# Patient Record
Sex: MALE | URBAN - METROPOLITAN AREA
[De-identification: names, ages, dates, MRNs, and addresses within clinical notes are randomized per-mention and may not be internally consistent; named-entity substitution may affect disease eponyms.]

---

## 2018-08-29 ENCOUNTER — APPOINTMENT (RX ONLY)
Dept: URBAN - METROPOLITAN AREA CLINIC 138 | Facility: CLINIC | Age: 48
Setting detail: DERMATOLOGY
End: 2018-08-29

## 2018-08-29 DIAGNOSIS — L81.4 OTHER MELANIN HYPERPIGMENTATION: ICD-10-CM

## 2018-08-29 DIAGNOSIS — L57.0 ACTINIC KERATOSIS: ICD-10-CM

## 2018-08-29 DIAGNOSIS — D22 MELANOCYTIC NEVI: ICD-10-CM

## 2018-08-29 DIAGNOSIS — L82.1 OTHER SEBORRHEIC KERATOSIS: ICD-10-CM

## 2018-08-29 DIAGNOSIS — D18.0 HEMANGIOMA: ICD-10-CM

## 2018-08-29 DIAGNOSIS — L57.8 OTHER SKIN CHANGES DUE TO CHRONIC EXPOSURE TO NONIONIZING RADIATION: ICD-10-CM

## 2018-08-29 PROBLEM — D18.01 HEMANGIOMA OF SKIN AND SUBCUTANEOUS TISSUE: Status: ACTIVE | Noted: 2018-08-29

## 2018-08-29 PROBLEM — Z85.820 PERSONAL HISTORY OF MALIGNANT MELANOMA OF SKIN: Status: ACTIVE | Noted: 2018-08-29

## 2018-08-29 PROBLEM — F41.9 ANXIETY DISORDER, UNSPECIFIED: Status: ACTIVE | Noted: 2018-08-29

## 2018-08-29 PROBLEM — D48.5 NEOPLASM OF UNCERTAIN BEHAVIOR OF SKIN: Status: ACTIVE | Noted: 2018-08-29

## 2018-08-29 PROBLEM — L29.8 OTHER PRURITUS: Status: ACTIVE | Noted: 2018-08-29

## 2018-08-29 PROCEDURE — 17003 DESTRUCT PREMALG LES 2-14: CPT

## 2018-08-29 PROCEDURE — 17000 DESTRUCT PREMALG LESION: CPT

## 2018-08-29 PROCEDURE — ? BIOPSY BY SHAVE METHOD

## 2018-08-29 PROCEDURE — ? COUNSELING

## 2018-08-29 PROCEDURE — ? LIQUID NITROGEN

## 2018-08-29 PROCEDURE — 99202 OFFICE O/P NEW SF 15 MIN: CPT | Mod: 25

## 2018-08-29 PROCEDURE — 11100: CPT | Mod: 59

## 2018-08-29 PROCEDURE — 11101: CPT

## 2018-08-29 ASSESSMENT — LOCATION ZONE DERM
LOCATION ZONE: TRUNK
LOCATION ZONE: FEET
LOCATION ZONE: HAND
LOCATION ZONE: FACE
LOCATION ZONE: ARM

## 2018-08-29 ASSESSMENT — LOCATION SIMPLE DESCRIPTION DERM
LOCATION SIMPLE: LEFT PLANTAR SURFACE
LOCATION SIMPLE: RIGHT FOREHEAD
LOCATION SIMPLE: LEFT FOREARM
LOCATION SIMPLE: LEFT HAND
LOCATION SIMPLE: LEFT UPPER ARM
LOCATION SIMPLE: RIGHT UPPER BACK
LOCATION SIMPLE: LEFT CHEEK
LOCATION SIMPLE: CHEST
LOCATION SIMPLE: RIGHT FOREARM
LOCATION SIMPLE: RIGHT UPPER ARM

## 2018-08-29 ASSESSMENT — LOCATION DETAILED DESCRIPTION DERM
LOCATION DETAILED: LEFT MEDIAL PLANTAR MIDFOOT
LOCATION DETAILED: LEFT VENTRAL DISTAL FOREARM
LOCATION DETAILED: RIGHT MEDIAL UPPER BACK
LOCATION DETAILED: LEFT LATERAL PLANTAR MIDFOOT
LOCATION DETAILED: RIGHT ANTERIOR PROXIMAL UPPER ARM
LOCATION DETAILED: LEFT ULNAR DORSAL HAND
LOCATION DETAILED: RIGHT MEDIAL SUPERIOR CHEST
LOCATION DETAILED: RIGHT INFERIOR LATERAL FOREHEAD
LOCATION DETAILED: RIGHT VENTRAL DISTAL FOREARM
LOCATION DETAILED: LEFT CENTRAL BUCCAL CHEEK
LOCATION DETAILED: LEFT ANTERIOR PROXIMAL UPPER ARM

## 2022-12-10 ENCOUNTER — EMERGENCY (EMERGENCY)
Facility: HOSPITAL | Age: 52
LOS: 1 days | Discharge: HOME | End: 2022-12-10
Attending: EMERGENCY MEDICINE
Payer: MEDICARE

## 2022-12-10 VITALS
SYSTOLIC BLOOD PRESSURE: 122 MMHG | HEART RATE: 85 BPM | DIASTOLIC BLOOD PRESSURE: 76 MMHG | OXYGEN SATURATION: 98 % | RESPIRATION RATE: 18 BRPM | TEMPERATURE: 99 F

## 2022-12-10 DIAGNOSIS — F20.9 SCHIZOPHRENIA, UNSPECIFIED: ICD-10-CM

## 2022-12-10 LAB
AMPHET UR-MCNC: NEGATIVE — SIGNIFICANT CHANGE UP
ANION GAP SERPL CALC-SCNC: 11 MMOL/L — SIGNIFICANT CHANGE UP (ref 5–17)
APAP SERPL-MCNC: <3 UG/ML — LOW (ref 10–26)
APPEARANCE UR: CLEAR — SIGNIFICANT CHANGE UP
BACTERIA # UR AUTO: ABNORMAL
BARBITURATES UR SCN-MCNC: NEGATIVE — SIGNIFICANT CHANGE UP
BASOPHILS # BLD AUTO: 0.06 K/UL — SIGNIFICANT CHANGE UP (ref 0–0.2)
BASOPHILS NFR BLD AUTO: 0.7 % — SIGNIFICANT CHANGE UP (ref 0–2)
BENZODIAZ UR-MCNC: NEGATIVE — SIGNIFICANT CHANGE UP
BILIRUB UR-MCNC: NEGATIVE — SIGNIFICANT CHANGE UP
BUN SERPL-MCNC: 10.5 MG/DL — SIGNIFICANT CHANGE UP (ref 8–20)
CALCIUM SERPL-MCNC: 9.7 MG/DL — SIGNIFICANT CHANGE UP (ref 8.4–10.5)
CHLORIDE SERPL-SCNC: 97 MMOL/L — SIGNIFICANT CHANGE UP (ref 96–108)
CO2 SERPL-SCNC: 25 MMOL/L — SIGNIFICANT CHANGE UP (ref 22–29)
COCAINE METAB.OTHER UR-MCNC: NEGATIVE — SIGNIFICANT CHANGE UP
COLOR SPEC: YELLOW — SIGNIFICANT CHANGE UP
CREAT SERPL-MCNC: 0.83 MG/DL — SIGNIFICANT CHANGE UP (ref 0.5–1.3)
DIFF PNL FLD: NEGATIVE — SIGNIFICANT CHANGE UP
EGFR: 105 ML/MIN/1.73M2 — SIGNIFICANT CHANGE UP
EOSINOPHIL # BLD AUTO: 0.15 K/UL — SIGNIFICANT CHANGE UP (ref 0–0.5)
EOSINOPHIL NFR BLD AUTO: 1.6 % — SIGNIFICANT CHANGE UP (ref 0–6)
EPI CELLS # UR: SIGNIFICANT CHANGE UP
ETHANOL SERPL-MCNC: <10 MG/DL — SIGNIFICANT CHANGE UP (ref 0–9)
FLUAV AG NPH QL: SIGNIFICANT CHANGE UP
FLUBV AG NPH QL: SIGNIFICANT CHANGE UP
GLUCOSE SERPL-MCNC: 91 MG/DL — SIGNIFICANT CHANGE UP (ref 70–99)
GLUCOSE UR QL: NEGATIVE MG/DL — SIGNIFICANT CHANGE UP
HCT VFR BLD CALC: 45.8 % — SIGNIFICANT CHANGE UP (ref 39–50)
HGB BLD-MCNC: 14.9 G/DL — SIGNIFICANT CHANGE UP (ref 13–17)
IMM GRANULOCYTES NFR BLD AUTO: 0.4 % — SIGNIFICANT CHANGE UP (ref 0–0.9)
KETONES UR-MCNC: NEGATIVE — SIGNIFICANT CHANGE UP
LEUKOCYTE ESTERASE UR-ACNC: ABNORMAL
LYMPHOCYTES # BLD AUTO: 1.86 K/UL — SIGNIFICANT CHANGE UP (ref 1–3.3)
LYMPHOCYTES # BLD AUTO: 20.3 % — SIGNIFICANT CHANGE UP (ref 13–44)
MCHC RBC-ENTMCNC: 30.3 PG — SIGNIFICANT CHANGE UP (ref 27–34)
MCHC RBC-ENTMCNC: 32.5 GM/DL — SIGNIFICANT CHANGE UP (ref 32–36)
MCV RBC AUTO: 93.1 FL — SIGNIFICANT CHANGE UP (ref 80–100)
METHADONE UR-MCNC: NEGATIVE — SIGNIFICANT CHANGE UP
MONOCYTES # BLD AUTO: 0.6 K/UL — SIGNIFICANT CHANGE UP (ref 0–0.9)
MONOCYTES NFR BLD AUTO: 6.5 % — SIGNIFICANT CHANGE UP (ref 2–14)
NEUTROPHILS # BLD AUTO: 6.46 K/UL — SIGNIFICANT CHANGE UP (ref 1.8–7.4)
NEUTROPHILS NFR BLD AUTO: 70.5 % — SIGNIFICANT CHANGE UP (ref 43–77)
NITRITE UR-MCNC: NEGATIVE — SIGNIFICANT CHANGE UP
OPIATES UR-MCNC: NEGATIVE — SIGNIFICANT CHANGE UP
PCP SPEC-MCNC: SIGNIFICANT CHANGE UP
PCP UR-MCNC: NEGATIVE — SIGNIFICANT CHANGE UP
PH UR: 6 — SIGNIFICANT CHANGE UP (ref 5–8)
PLATELET # BLD AUTO: 297 K/UL — SIGNIFICANT CHANGE UP (ref 150–400)
POTASSIUM SERPL-MCNC: 4 MMOL/L — SIGNIFICANT CHANGE UP (ref 3.5–5.3)
POTASSIUM SERPL-SCNC: 4 MMOL/L — SIGNIFICANT CHANGE UP (ref 3.5–5.3)
PROT UR-MCNC: NEGATIVE — SIGNIFICANT CHANGE UP
RBC # BLD: 4.92 M/UL — SIGNIFICANT CHANGE UP (ref 4.2–5.8)
RBC # FLD: 13.7 % — SIGNIFICANT CHANGE UP (ref 10.3–14.5)
RBC CASTS # UR COMP ASSIST: SIGNIFICANT CHANGE UP /HPF (ref 0–4)
RSV RNA NPH QL NAA+NON-PROBE: SIGNIFICANT CHANGE UP
SALICYLATES SERPL-MCNC: <0.6 MG/DL — LOW (ref 10–20)
SARS-COV-2 RNA SPEC QL NAA+PROBE: SIGNIFICANT CHANGE UP
SODIUM SERPL-SCNC: 133 MMOL/L — LOW (ref 135–145)
SP GR SPEC: 1.01 — SIGNIFICANT CHANGE UP (ref 1.01–1.02)
THC UR QL: NEGATIVE — SIGNIFICANT CHANGE UP
UROBILINOGEN FLD QL: NEGATIVE MG/DL — SIGNIFICANT CHANGE UP
VALPROATE SERPL-MCNC: <3.7 UG/ML — LOW (ref 50–100)
WBC # BLD: 9.17 K/UL — SIGNIFICANT CHANGE UP (ref 3.8–10.5)
WBC # FLD AUTO: 9.17 K/UL — SIGNIFICANT CHANGE UP (ref 3.8–10.5)
WBC UR QL: SIGNIFICANT CHANGE UP /HPF (ref 0–5)

## 2022-12-10 PROCEDURE — 93010 ELECTROCARDIOGRAM REPORT: CPT

## 2022-12-10 PROCEDURE — 99218: CPT

## 2022-12-10 PROCEDURE — 99283 EMERGENCY DEPT VISIT LOW MDM: CPT

## 2022-12-10 RX ORDER — ARIPIPRAZOLE 15 MG/1
15 TABLET ORAL ONCE
Refills: 0 | Status: COMPLETED | OUTPATIENT
Start: 2022-12-10 | End: 2022-12-10

## 2022-12-10 RX ORDER — ARIPIPRAZOLE 15 MG/1
15 TABLET ORAL DAILY
Refills: 0 | Status: DISCONTINUED | OUTPATIENT
Start: 2022-12-10 | End: 2022-12-17

## 2022-12-10 RX ORDER — DIVALPROEX SODIUM 500 MG/1
500 TABLET, DELAYED RELEASE ORAL
Refills: 0 | Status: DISCONTINUED | OUTPATIENT
Start: 2022-12-10 | End: 2022-12-17

## 2022-12-10 RX ORDER — TRAZODONE HCL 50 MG
150 TABLET ORAL AT BEDTIME
Refills: 0 | Status: DISCONTINUED | OUTPATIENT
Start: 2022-12-10 | End: 2022-12-17

## 2022-12-10 RX ORDER — DIVALPROEX SODIUM 500 MG/1
250 TABLET, DELAYED RELEASE ORAL ONCE
Refills: 0 | Status: COMPLETED | OUTPATIENT
Start: 2022-12-10 | End: 2022-12-10

## 2022-12-10 RX ADMIN — DIVALPROEX SODIUM 250 MILLIGRAM(S): 500 TABLET, DELAYED RELEASE ORAL at 10:18

## 2022-12-10 RX ADMIN — ARIPIPRAZOLE 15 MILLIGRAM(S): 15 TABLET ORAL at 10:18

## 2022-12-10 RX ADMIN — DIVALPROEX SODIUM 500 MILLIGRAM(S): 500 TABLET, DELAYED RELEASE ORAL at 18:04

## 2022-12-10 RX ADMIN — Medication 150 MILLIGRAM(S): at 22:37

## 2022-12-10 NOTE — ED ADULT NURSE NOTE - CHIEF COMPLAINT QUOTE
pt BIBEMS due to suicidal thoughts. hx of paranoid schizophrenia and bipolar and was recently at [facility] under voluntary psych admission but reports hes off meds as he ran out. pt changed to yellow gown.

## 2022-12-10 NOTE — ED PROVIDER NOTE - CLINICAL SUMMARY MEDICAL DECISION MAKING FREE TEXT BOX
Decompensated schizophrenia due to medication nonadherence, will require psychiatric evaluation for risk assessment and disposition planning.

## 2022-12-10 NOTE — ED PROVIDER NOTE - OBJECTIVE STATEMENT
52yom w/ schizophrenia, nonadherent to medication regimen (depakote, abilify) due to homeless states he has been hearing voices telling him to kill himself for the past 2 weeks. Denies any specific plan.

## 2022-12-10 NOTE — ED BEHAVIORAL HEALTH PROGRESS NOTE - DETAILS
Pending bed availability  Patient stating he is hearing command auditory hallucinations telling him to hang himself, he states they are new onset.  Telling him to hang himself  Self referred

## 2022-12-10 NOTE — ED BEHAVIORAL HEALTH ASSESSMENT NOTE - HPI (INCLUDE ILLNESS QUALITY, SEVERITY, DURATION, TIMING, CONTEXT, MODIFYING FACTORS, ASSOCIATED SIGNS AND SYMPTOMS)
Patient a 53 y/o single male, un-domiciled, past psychiatric hx of Schizophrenia, most recent Hospitalization in [facility] for 30 days, hx of Cannabis abuse, one SA by OD 2 years back, no aggression. no NSSIB, no legal issues, no medical issues currently, was BIB/EMS activated by self due to above complaints.    Patient in bed, AAOX3, endorses that he was diagnosed with Schizophrenia, unemployed, homeless and not taking meds for 2 weeks as he ran pout pf meds given to him on discharge and not aware where he was supposed to f/u after discharge and now endorsing that he stared to hear voices, low in intensity, with fair to good sleep/appetite. he endorses that he takes Abilify 30 mg with Depakote and as he has not been taking meds he stared to hear voices and the voices were telling him to self harm. he has no intention to harm self at this time, and agreed to have the meds e.g. Abilify/Depakote. He was given 1 dose of Abilify 15 mg with Depakote 250 mg and to be observed for few hours and agreed to have a referral to a shelter as he has no place to stay. He has not been following anywhere, and goes from place top place. he added that the only time he committed suicide was 2 years back when he OD on his meds and was hospitalized at a [facility]. He also agreed to have a referral to a place and would take meds if send to a Pharmacy. He also smokes cannabis daily since age 14 and has no rehab for Cannabis abuse.     No medical issues now

## 2022-12-10 NOTE — ED BEHAVIORAL HEALTH ASSESSMENT NOTE - SUMMARY
Patient a 51 y/o single male, un-domiciled, past psychiatric hx of Schizophrenia, most recent Hospitalization in [facility] for 30 days, hx of Cannabis abuse, one SA by OD 2 years back, no aggression. no NSSIB, no legal issues, no medical issues currently, was BIB/EMS activated by self due to above complaints.    Patient in bed, AAOX3, endorses that he was diagnosed with Schizophrenia, unemployed, homeless and not taking meds for 2 weeks as he ran pout pf meds given to him on discharge and not aware where he was supposed to f/u after discharge and now endorsing that he stared to hear voices, low in intensity, with fair to good sleep/appetite. he endorses that he takes Abilify 30 mg with Depakote and as he has not been taking meds he stared to hear voices and the voices were telling him to self harm. he has no intention to harm self at this time, and agreed to have the meds e.g. Abilify/Depakote. He was given 1 dose of Abilify 15 mg with Depakote 250 mg and to be observed for few hours and agreed to have a referral to a shelter as he has no place to stay. He has not been following anywhere, and goes from place top place. he added that the only time he committed suicide was 2 years back when he OD on his meds and was hospitalized at a [facility]. He also agreed to have a referral to a place and would take meds if send to a Pharmacy. He also smokes cannabis daily since age 14 and has no rehab for Cannabis abuse.     Plan: To give 1 dose of Abilify 15 mg with Depakote 250 mg          Observe for few hours          Shelter Referral          May Discharge once accepted at shelter with 2 weeks meds supply          [facility] Referral if discharged

## 2022-12-10 NOTE — ED BEHAVIORAL HEALTH PROGRESS NOTE - SUMMARY
Patient a 51 y/o single male, un-domiciled, past psychiatric hx of Schizophrenia, most recent Hospitalization in [facility] for 30 days, hx of Cannabis abuse, one SA by OD 2 years back, no aggression. no NSSIB, no legal issues, no medical issues currently, was BIB/EMS activated by self due to auditory hallucinations.     Patient in bed, AAOX3, endorses that he was diagnosed with Schizophrenia, unemployed, homeless and not taking meds for 2 weeks as he ran pout of meds given to him on discharge and not aware where he was supposed to f/u after discharge and now endorsing that he stared to hear voices, low in intensity, with fair to good sleep/appetite. Patient was evaluated this morning by attending physician with plan to give 1 dose of Abilify 15 mg with Depakote 250 mg, observe for a few hours and provide with Shelter Referral. Patient is now endorsing suicidal ideation induced by command auditory hallucinations telling him to hang himself, patient will be transferred voluntary for inpatient psychiatric hospitalization.

## 2022-12-10 NOTE — ED BEHAVIORAL HEALTH PROGRESS NOTE - CASE SUMMARY/FORMULATION (CLEARLY DOCUMENT RATIONALE FOR DISPOSITION CHANGE)
Patient a 51 y/o single male, un-domiciled, past psychiatric hx of Schizophrenia, most recent Hospitalization in [facility] for 30 days, hx of Cannabis abuse, one SA by OD 2 years back, no aggression. no NSSIB, no legal issues, no medical issues currently, was BIB/EMS activated by self due to auditory hallucinations.     Patient in bed, AAOX3, endorses that he was diagnosed with Schizophrenia, unemployed, homeless and not taking meds for 2 weeks as he ran pout of meds given to him on discharge and not aware where he was supposed to f/u after discharge and now endorsing that he stared to hear voices, low in intensity, with fair to good sleep/appetite. Patient was evaluated this morning by attending physician with plan to give 1 dose of Abilify 15 mg with Depakote 250 mg, observe for a few hours and provide with Shelter Referral. Patient is now endorsing suicidal ideation induced by command auditory hallucinations telling him to hang himself, patient will be transferred voluntary for inpatient psychiatric hospitalization.

## 2022-12-10 NOTE — ED ADULT TRIAGE NOTE - CHIEF COMPLAINT QUOTE
pt BIBEMS due to suicidal thoughts. hx of paranoid schizophrenia and bipolar and was recently at [facility] under voluntary psych admission but reports hes off meds as he ran out. pt changed to yellow gown.

## 2022-12-10 NOTE — ED ADULT NURSE NOTE - HPI (INCLUDE ILLNESS QUALITY, SEVERITY, DURATION, TIMING, CONTEXT, MODIFYING FACTORS, ASSOCIATED SIGNS AND SYMPTOMS)
received pt in [facility] by security for [employer].  pt is ao.  states he is feeling suicidal thoughts and hearing voices to hurt himself. admits to one s/a in past 2-3 years ago od on his medication.  as per pt hx paranoid schizophrenia bipolar. states he was on Depakote 500mg  am and pm.  Trazadone 150mg, and Abilify 40mg.  Currently he has no psychiatrist.  he denies vh denies drug and alcohol use. He states he is homeless and lives off ssi.  he moves a lot state to state  taking [employer]  planes etc.  currently [place] prior to that he was in [place]  from sept to nov.  and before that was in [place] for 3 months.  he was last hospitalized on [address] last month.  as per pt without medication for a week.  pt is cooperative and calm.  meal tray and po fluid was provided.

## 2022-12-10 NOTE — ED CDU PROVIDER INITIAL DAY NOTE - DETAILS
Patient presented for suicidal thoughts, was reassessed, another hold for reassessment in the morning by psych.

## 2022-12-11 PROCEDURE — 99226: CPT

## 2022-12-11 RX ORDER — NICOTINE POLACRILEX 2 MG
4 GUM BUCCAL
Refills: 0 | Status: DISCONTINUED | OUTPATIENT
Start: 2022-12-11 | End: 2022-12-17

## 2022-12-11 RX ADMIN — DIVALPROEX SODIUM 500 MILLIGRAM(S): 500 TABLET, DELAYED RELEASE ORAL at 05:31

## 2022-12-11 RX ADMIN — ARIPIPRAZOLE 15 MILLIGRAM(S): 15 TABLET ORAL at 11:37

## 2022-12-11 RX ADMIN — Medication 30 MILLILITER(S): at 14:18

## 2022-12-11 RX ADMIN — Medication 150 MILLIGRAM(S): at 21:24

## 2022-12-11 RX ADMIN — DIVALPROEX SODIUM 500 MILLIGRAM(S): 500 TABLET, DELAYED RELEASE ORAL at 17:34

## 2022-12-11 NOTE — ED ADULT NURSE REASSESSMENT NOTE - COMFORT CARE
meal provided/plan of care explained/po fluids offered
ambulated to bathroom/meal provided/plan of care explained/po fluids offered
plan of care explained
meal provided/plan of care explained
meal provided/plan of care explained/po fluids offered
darkened lights
meal provided/plan of care explained
plan of care explained

## 2022-12-11 NOTE — ED ADULT NURSE REASSESSMENT NOTE - STATUS
reassesssment
awaiting transfer, no change
awaiting consult
awaiting transfer, no change
awaiting transfer, no change
reassessment

## 2022-12-11 NOTE — ED ADULT NURSE REASSESSMENT NOTE - GENERAL PATIENT STATE
comfortable appearance/cooperative
comfortable appearance/resting/sleeping
comfortable appearance/resting/sleeping
cooperative
comfortable appearance/resting/sleeping
comfortable appearance/resting/sleeping
comfortable appearance/cooperative
cooperative
comfortable appearance/cooperative
comfortable appearance/cooperative
no change observed

## 2022-12-11 NOTE — ED BEHAVIORAL HEALTH NOTE - BEHAVIORAL HEALTH NOTE
PROGRESS NOTE: 22 @ 12:58  	  • Reason for Ongoing Consultation: 	SI    ID: 52yyo Male with HEALTH ISSUES - PROBLEM Dx:  Schizophrenia, unspecified type            INTERVAL DATA:   • Interval Chief Complaint: Reports AH hearing voices "Kill  myself by hanging myself"  • Interval History: " SI "      As per documentation: Patient a 53 y/o single male, un-domiciled, past psychiatric hx of Schizophrenia, most recent Hospitalization in [place] [place] for 30 days, hx of Cannabis abuse, one SA by OD 2 years back, no aggression. no NSSIB, no legal issues, no medical issues currently, was BIB/EMS activated by self due to above complaints.    Patient in bed, AAOX3, endorses that he was diagnosed with Schizophrenia, unemployed, homeless and not taking meds for 2 weeks as he ran pout pf meds given to him on discharge and not aware where he was supposed to f/u after discharge and now endorsing that he stared to hear voices, low in intensity, with fair to good sleep/appetite. he endorses that he takes Abilify 30 mg with Depakote and as he has not been taking meds he stared to hear voices and the voices were telling him to self harm. he has no intention to harm self at this time, and agreed to have the meds e.g. Abilify/Depakote. He was given 1 dose of Abilify 15 mg with Depakote 250 mg and to be observed for few hours and agreed to have a referral to a shelter as he has no place to stay. He has not been following anywhere, and goes from place top place. he added that the only time he committed suicide was 2 years back when he OD on his meds and was hospitalized at a [facility]. He also agreed to have a referral to a place and would take meds if send to a Pharmacy. He also smokes cannabis daily since age 14 and has no rehab for Cannabis abuse.     No medical issues now    22: Patient is calm and cooperative and continues to endorse SI , today he stated to writer he is having auditory hallucinations telling him "Kill myself by hanging myself".  He denies any homicidal ideations plan or intent. Reports his sleep and appetite are good, he continues to seek vol admission. Pt is alert and oriented and cooperative.  No aggressive behaviors noted.       REVIEW OF SYSTEMS:   • Constitutional Symptoms	No complaints  • Eyes	No complaints  • Ears / Nose / Throat / Mouth	No complaints  • Cardiovascular	No complaints  • Respiratory	No complaints  • Gastrointestinal	No complaints  • Genitourinary	No complaints  • Musculoskeletal	No complaints  • Skin	No complaints  • Neurological	No complaints  • Psychiatric (see HPI)	See HPI  • Endocrine	No complaints  • Hematologic / Lymphatic	No complaints  • Allergic / Immunologic	No complaints    REVIEW OF VITALS/LABS/IMAGING/INVESTIGATIONS:   • Vital signs reviewed: Yes  • Vital Signs:	    T(C): 36.8 (22 @ 11:37), Max: 36.9 (12-10-22 @ 19:21)  HR: 81 (22 @ 11:37) (61 - 82)  BP: 121/85 (22 @ 11:37) (107/55 - 121/85)  RR: 18 (22 @ 11:37) (18 - 18)  SpO2: 100% (22 @ 11:37) (95% - 100%)    • Available labs reviewed: Yes  • Available Lab Results:                           14.9   9.17  )-----------( 297      ( 10 Dec 2022 02:10 )             45.8     12-10    133<L>  |  97  |  10.5  ----------------------------<  91  4.0   |  25.0  |  0.83    Ca    9.7      10 Dec 2022 02:10          Urinalysis Basic - ( 10 Dec 2022 10:13 )    Color: Yellow / Appearance: Clear / S.015 / pH: x  Gluc: x / Ketone: Negative  / Bili: Negative / Urobili: Negative mg/dL   Blood: x / Protein: Negative / Nitrite: Negative   Leuk Esterase: Trace / RBC: 0-2 /HPF / WBC 0-2 /HPF   Sq Epi: x / Non Sq Epi: Occasional / Bacteria: Occasional          MEDICATIONS:      PRN Medications: n/a  • PRN Medications since last evaluation	  • PRN Details	    Current Medications:   aluminum hydroxide/magnesium hydroxide/simethicone Suspension 30 milliLiter(s) Oral every 6 hours PRN  ARIPiprazole 15 milliGRAM(s) Oral daily  divalproex  milliGRAM(s) Oral two times a day  nicotine  Polacrilex Lozenge 4 milliGRAM(s) Oral every 2 hours PRN  traZODone 150 milliGRAM(s) Oral at bedtime     Medication Side Effects:  • Medication Side Effects or Adverse Reactions (new or ongoing)	None known    MENTAL STATUS EXAM:   • Level of Consciousness	Alert  • General Appearance	Well developed  • Body Habitus	Well nourished  • Hygiene	Good  • Grooming	Good  • Behavior	Cooperative  • Eye Contact	Good  • Relatedness	Good  • Impulse Control	Normal  • Muscle Tone / Strength	Normal muscle tone/strength  • Abnormal Movements	No abnormal movements  • Gait / Station	Normal gait / station  • Speech Volume	Normal  • Speech Rate	Normal  • Speech Spontaneity	Normal  • Speech Articulation	Normal  • Mood	Normal  • Affect Quality	Euthymic  • Affect Range	Full  • Affect Congruence	Congruent  • Thought Process	Linear  • Thought Associations	 suicidal ideations  • Thought Content	Unremarkable  • Perceptions	No abnormalities  • Oriented to Time	Yes  • Oriented to Place	Yes  • Oriented to Situation	Yes  • Oriented to Person	Yes  • Attention / Concentration	Normal  • Estimated Intelligence	Average  • Recent Memory	Normal  • Remote Memory	Normal  • Fund of Knowledge	Normal  • Language	No abnormalities noted  • Judgment (regarding everyday events)	Fair  • Insight (regarding psychiatric illness)	Fair    SUICIDALITY:   • Suicidality (Interval)	none known    HOMICIDALITY/AGGRESSION:   • Homicidality/Aggression	none known    DIAGNOSIS DSM-V:    Psychiatric Diagnosis (Corresponds to DSM-IV Axis I, II):   HEALTH ISSUES - PROBLEM Dx:  Schizophrenia, unspecified type             Medical Diagnosis (Corresponds to DSM-IV Axis III): n/a  • Axis III	      ASSESSMENT OF CURRENT CONDITION:   Summary (include case differential, formulation and patient response to therapy):   22: Patient is calm and cooperative and continues to endorse SI , today he stated to writer he is having auditory hallucinations telling him "Kill myself by hanging myself".  He denies any homicidal ideations plan or intent. Reports his sleep and appetite are good, he continues to seek vol admission. Pt is alert and oriented and cooperative.  No aggressive behaviors noted.     Risk Assessment (consider static vs modifiable risk factors and protective factors; comment on level of risk for dangerous behavior):   RF: homeless, not on medications. AH for kill himself  PF: seeking treatment    PLAN  Vol admission , pending bed/transfer.

## 2022-12-11 NOTE — ED ADULT NURSE REASSESSMENT NOTE - NSIMPLEMENTINTERV_GEN_ALL_ED
Implemented All Universal Safety Interventions:  [place] to call system. Call bell, personal items and telephone within reach. Instruct patient to call for assistance. Room bathroom lighting operational. Non-slip footwear when patient is off stretcher. Physically safe environment: no spills, clutter or unnecessary equipment. Stretcher in lowest position, wheels locked, appropriate side rails in place.
WDL

## 2022-12-12 VITALS
SYSTOLIC BLOOD PRESSURE: 115 MMHG | OXYGEN SATURATION: 98 % | DIASTOLIC BLOOD PRESSURE: 73 MMHG | RESPIRATION RATE: 16 BRPM | HEART RATE: 77 BPM

## 2022-12-12 PROCEDURE — 36415 COLL VENOUS BLD VENIPUNCTURE: CPT

## 2022-12-12 PROCEDURE — 99284 EMERGENCY DEPT VISIT MOD MDM: CPT

## 2022-12-12 PROCEDURE — 99217: CPT

## 2022-12-12 PROCEDURE — 80164 ASSAY DIPROPYLACETIC ACD TOT: CPT

## 2022-12-12 PROCEDURE — 80048 BASIC METABOLIC PNL TOTAL CA: CPT

## 2022-12-12 PROCEDURE — 93005 ELECTROCARDIOGRAM TRACING: CPT

## 2022-12-12 PROCEDURE — 81001 URINALYSIS AUTO W/SCOPE: CPT

## 2022-12-12 PROCEDURE — G0378: CPT

## 2022-12-12 PROCEDURE — 85025 COMPLETE CBC W/AUTO DIFF WBC: CPT

## 2022-12-12 PROCEDURE — 87637 SARSCOV2&INF A&B&RSV AMP PRB: CPT

## 2022-12-12 PROCEDURE — 80307 DRUG TEST PRSMV CHEM ANLYZR: CPT

## 2022-12-12 RX ORDER — TRAZODONE HCL 50 MG
1 TABLET ORAL
Qty: 0 | Refills: 0 | DISCHARGE
Start: 2022-12-12 | End: 2023-01-12

## 2022-12-12 RX ORDER — TRAZODONE HCL 50 MG
1 TABLET ORAL
Qty: 30 | Refills: 0
Start: 2022-12-12 | End: 2023-01-10

## 2022-12-12 RX ORDER — ARIPIPRAZOLE 15 MG/1
1 TABLET ORAL
Qty: 30 | Refills: 0
Start: 2022-12-12

## 2022-12-12 RX ORDER — DIVALPROEX SODIUM 500 MG/1
1 TABLET, DELAYED RELEASE ORAL
Qty: 60 | Refills: 0
Start: 2022-12-12 | End: 2023-01-10

## 2022-12-12 RX ADMIN — DIVALPROEX SODIUM 500 MILLIGRAM(S): 500 TABLET, DELAYED RELEASE ORAL at 05:47

## 2022-12-12 NOTE — ED BEHAVIORAL HEALTH NOTE - BEHAVIORAL HEALTH NOTE
PROGRESS NOTE: 22 @ 9:20  	  • Reason for Ongoing Consultation:  "SI"    ID: 51y/o Male with HEALTH ISSUES - PROBLEM Dx:  Schizophrenia, unspecified type      INTERVAL DATA:   • Interval Chief Complaint: "I need to go to [facility]"   • Interval History:   As per documentation: Patient a 53 y/o single male, un-domiciled, past psychiatric hx of Schizophrenia, most recent Hospitalization in [facility] for 30 days, hx of Cannabis abuse, one SA by OD 2 years back, no aggression. no NSSIB, no legal issues, no medical issues currently, was BIB/EMS activated by self due to above complaints.    Patient sitting in chair calm and alert, denies current AH or S/H I/I/P. Patient said he changed his mind about admission and would like to be discharged so he can go to [facility] to help him with housing. Patient describes his mood as alright, has a good appetite, and said he slept well last night. Patient attributes his auditory hallucinations were due to him forgetting to take his medication. Patient agrees to be more compliant with medications and requested refill at in house pharmacy. Patient identifies reasons for living including "day to day life" and the money he receives from . Patient says if he hears voices again he will call 911 again and bring himself to the hospital for treatment. Patient accepted referral to outpatient provider for continued care. No global insomnia, no preoccupations, no special rich endorsed, no aggression, or psychosis.     REVIEW OF SYSTEMS:   • Constitutional Symptoms	No complaints  • Eyes	No complaints  • Ears / Nose / Throat / Mouth	No complaints  • Cardiovascular	No complaints  • Respiratory	No complaints  • Gastrointestinal	No complaints  • Genitourinary	No complaints  • Musculoskeletal	No complaints  • Skin	No complaints  • Neurological	No complaints  • Psychiatric (see HPI)	See HPI  • Endocrine	No complaints  • Hematologic / Lymphatic	No complaints  • Allergic / Immunologic	No complaints    REVIEW OF VITALS/LABS/IMAGING/INVESTIGATIONS:   • Vital signs reviewed: Yes  • Vital Signs:	    ICU Vital Signs Last 24 Hrs  T(C): 36.9 (12 Dec 2022 05:59), Max: 36.9 (12 Dec 2022 00:13)  T(F): 98.4 (12 Dec 2022 05:59), Max: 98.4 (12 Dec 2022 00:13)  HR: 79 (12 Dec 2022 05:59) (75 - 90)  BP: 117/68 (12 Dec 2022 05:59) (102/64 - 127/83)  BP(mean): --  ABP: --  ABP(mean): --  RR: 18 (12 Dec 2022 05:59) (18 - 18)  SpO2: 98% (12 Dec 2022 05:59) (97% - 100%)    O2 Parameters below as of 11 Dec 2022 19:18  Patient On (Oxygen Delivery Method): room air    • Available labs reviewed: Yes  • Available Lab Results:                       14.9   9.17  )-----------( 297      ( 10 Dec 2022 02:10 )             45.8     12-10    133<L>  |  97  |  10.5  ----------------------------<  91  4.0   |  25.0  |  0.83    Ca    9.7      10 Dec 2022 02:10      Urinalysis Basic - ( 10 Dec 2022 10:13 )    Color: Yellow / Appearance: Clear / S.015 / pH: x  Gluc: x / Ketone: Negative  / Bili: Negative / Urobili: Negative mg/dL   Blood: x / Protein: Negative / Nitrite: Negative   Leuk Esterase: Trace / RBC: 0-2 /HPF / WBC 0-2 /HPF   Sq Epi: x / Non Sq Epi: Occasional / Bacteria: Occasional          MEDICATIONS:      PRN Medications  • PRN Medications since last evaluation: N/A	  • PRN Details	    Current Medications:   aluminum hydroxide/magnesium hydroxide/simethicone Suspension 30 milliLiter(s) Oral every 6 hours PRN  ARIPiprazole 15 milliGRAM(s) Oral daily  divalproex  milliGRAM(s) Oral two times a day  nicotine  Polacrilex Lozenge 4 milliGRAM(s) Oral every 2 hours PRN  traZODone 150 milliGRAM(s) Oral at bedtime       Medication Side Effects:  • Medication Side Effects or Adverse Reactions (new or ongoing)	None known    MENTAL STATUS EXAM:   • Level of Consciousness	Alert  • General Appearance	Well developed  • Body Habitus	Well nourished  • Hygiene	Fair   • Grooming	Fair   • Behavior	Cooperative  • Eye Contact	Good  • Relatedness	Good  • Impulse Control	Normal  • Muscle Tone / Strength	Normal muscle tone/strength  • Abnormal Movements	No abnormal movements  • Gait / Station	unable to assess  • Speech Volume	Normal  • Speech Rate	Normal  • Speech Spontaneity	Normal  • Speech Articulation	Normal  • Mood	"Alright"  • Affect Quality	Euthymic  • Affect Range	Full  • Affect Congruence	Congruent  • Thought Process	Linear  • Thought Associations	 normal  • Thought Content	Wants to go to [facility].  • Perceptions	No abnormalities at this time   • Oriented to Time	Yes  • Oriented to Place	Yes  • Oriented to Situation	Yes  • Oriented to Person	Yes  • Attention / Concentration	Normal  • Estimated Intelligence	Average  • Recent Memory	Normal  • Remote Memory	Normal  • Fund of Knowledge	Normal  • Language	No abnormalities noted  • Judgment (regarding everyday events)	Fair  • Insight (regarding psychiatric illness)	Fair    SUICIDALITY:   • Suicidality (Interval)	no current S I/I/P    HOMICIDALITY/AGGRESSION:   • Homicidality/Aggression	none known    DIAGNOSIS DSM-V:    Psychiatric Diagnosis (Corresponds to DSM-IV Axis I, II):   HEALTH ISSUES - PROBLEM Dx:  Schizophrenia, unspecified type       Medical Diagnosis (Corresponds to DSM-IV Axis III): N/A  • Axis III	      ASSESSMENT OF CURRENT CONDITION:   Summary (include case differential, formulation and patient response to therapy):   Pt is a 53 y/o single male, un-domiciled, past psychiatric hx of Schizophrenia, most recent Hospitalization in [facility] for 30 days, hx of Cannabis abuse, one SA by OD 2 years back, no aggression. no NSSIB, no legal issues, no medical issues currently, was BIB/EMS activated by self due to Auditory command hallucinations. Patient is stable and no longer endorsing AH or S/H I/I/P. Patient endorses feeling better since being put back on his medication. Patient was able to contract for safety and agrees to  medications from hospital pharmacy and f/u with an outpatient provider.    Risk Assessment (consider static vs modifiable risk factors and protective factors; comment on level of risk for dangerous behavior):   RF: homeless, not on medications. AH for kill himself  PF: seeking treatment, identifies reasons for living     PLAN  Discharge patient  Medications sent to [facility]   Instruct patient to f/u with outpatient provider  Pt educated to call 911 or return to ED if he starts to have AH/VH or S/H I/I/P.

## 2022-12-12 NOTE — ED CDU PROVIDER DISPOSITION NOTE - PATIENT PORTAL LINK FT
You can access the FollowMyHealth Patient Portal offered by [facility] by registering at the following website: [email or web address]. By joining [employer]’s FollowMyHealth portal, you will also be able to view your health information using other applications (apps) compatible with our system.

## 2022-12-12 NOTE — ED CDU PROVIDER DISPOSITION NOTE - WET READ LAUNCH FT
Administrations This Visit     methylPREDNISolone acetate (DEPO-MEDROL) injection 80 mg     Admin Date  02/28/2022  12:35 Action  Given Dose  80 mg Route  Intra-artICUlar Site  Shoulder Left Administered By  Opal Hawkins, [address] [place] [address] ([address] [place] [address])    Ordering Provider: Dayanara Falcon DO    NDC: 1415-9523-61    Lot#: [id number]    : [id number].     Patient Supplied?: No    Comments: Mixed with Bupivacaine 0.5% 2ml   lot# [id number] Exp 09/01/22 There are no Wet Read(s) to document.

## 2022-12-12 NOTE — ED CDU PROVIDER DISPOSITION NOTE - NS_EDPROVIDERDISPOUSERTYPE_ED_A_ED
Attending Attestation (For Attendings USE Only)...
I personally performed the service described in the documentation recorded by the scribe in my presence, and it accurately and completely records my words and actions.
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
## 2022-12-12 NOTE — ED ADULT NURSE REASSESSMENT NOTE - NS ED NURSE REASSESS COMMENT FT1
Assumed care of patient at 0710.  Patient resting in bed appears to be sleeping with no distress and regular nonlabored breathing noted.  No attempts to harm self or others and safety maintained.
Patient in bed sleeping, safety maintained.
Patient received AM medication, tolerated well, no complain voiced, able to sleep without disturbances, PO fluids given and tolerated, no hallucination, no suicidal thoughts, all needs attend, safety maintained.
Patient received PM medication, tolerates well, no complain offered, patient in bed sleeping, safety maintained.
Patient reports he feels unsafe to be discharged due to suicidal ideations.  Patient requesting to be hospitalized in a psychiatric unit.  No attempts to harm self or others.  Patient resting in bed with no distress.  Safety of patient maintained.
Pt asking to speak with SW, offers no medical complaints and is calm and cooperative. Pt awaiting psych vol transfer.
Pt calm, cooperative and understanding of his plan to DC via Bus to [facility] for shelter placement. SW, psych provider, and ER MD all on same page with plan of care.
Pt requesting shelter placement and states he feels better and does not want to go inpt psych.
awake, alert and consumed lunch 100%. c/o indigestion and requesting Maalox which was provided. no attempts to harm self or others.
sleeping through this time awaiting consult
sleeping through this time. awaiting consult.
Patient ate 100% of his breakfast.  Patient continues to endorse feeling depressed and requesting inpatient hospitalization.  Patient educated about plan of care including transfer when a bed is available and agrees with plan.  No attempts to harm self or others and safety maintained.
Patient ate 100% of his dinner.  Patient resting in bed with no distress.  Patient complaint with medications.  Verbally contracts not to harm self in the hospital and safety maintained.
Patient ate 50% of his breakfast.  Patient complain with medications as prescribed.  Patient provided urine for testing.  No attempts to harm self or others and safety maintained.
Patient watching television or reading a book in his room.  Patient has a good appetite eating 100% of his meals please snacks.  No attempts to harm self or others.  Safety of patient maintained.
Assumed care of patient at 0705.  Patient resting in bed appears to be sleeping with no distress and regular nonlabored breathing.  Safety of patient maintained.
Endorse patient care at 07:00 PM. Patient in bed sleeping, no complain voiced, meals and PO fluids tolerated, no attempt to harm self and other, safety maintained.

## 2023-07-25 NOTE — ED CDU PROVIDER INITIAL DAY NOTE - NS_EDPROVIDERDISPOUSERTYPE_ED_A_ED
Attending Attestation (For Attendings USE Only)... Pt will complete lower body dressing with independence and AD as needed within 4 weeks.

## 2023-09-20 ENCOUNTER — APPOINTMENT (OUTPATIENT)
Dept: DERMATOLOGY | Facility: CLINIC | Age: 53
End: 2023-09-20

## 2023-10-03 PROBLEM — Z00.00 ENCOUNTER FOR PREVENTIVE HEALTH EXAMINATION: Status: ACTIVE | Noted: 2023-10-03

## 2024-03-11 RX ORDER — TRAZODONE HCL 50 MG
0 TABLET ORAL
Qty: 0 | Refills: 0 | DISCHARGE

## 2024-03-11 RX ORDER — ARIPIPRAZOLE 15 MG/1
40 TABLET ORAL
Qty: 0 | Refills: 0 | DISCHARGE

## 2024-03-11 RX ORDER — DIVALPROEX SODIUM 500 MG/1
0 TABLET, DELAYED RELEASE ORAL
Qty: 0 | Refills: 0 | DISCHARGE

## 2025-01-22 NOTE — ED BEHAVIORAL HEALTH ASSESSMENT NOTE - EMPLOYMENT
No protocol for requested medication.    Medication:   Disp Refills Start End     lisdexamfetamine (VYVANSE) 30 MG capsule 30 capsule 0 12/26/2024 --    Sig - Route: Take 1 capsule by mouth every morning.      Last office visit date: 10/15/24  Pharmacy: [id number] - [place], [place] - [address]    Order pended, routed to clinician for review.     Recommended Follow Up: Spring 2025  No Show/Cancel:   Next visit: 4/16/2025    Controlled Med - Routed to Provider due to NO PROTOCOL. Orders have been prepped according to providers note.     Ordered date: 12/23/24 for 12/26/24  Last RX dispensed (per PDMP): 1/8/25 #30  Last Drug Screen:             No protocol for requested medication.    Medication:     Disp Refills Start End     methylpheniDATE (RITALIN) 5 MG tablet 30 tablet 0 12/26/2024 --    Sig - Route: Take 1 tablet by mouth every afternoon.        Order pended, routed to clinician for review.     Next visit: 4/16/2025    Controlled Med - Routed to Provider due to NO PROTOCOL. Orders have been prepped according to providers note.     Ordered date: 12/23/24 for 12/26/24  Last RX dispensed (per PDMP): 1/8/25 #30          
Unemployed